# Patient Record
Sex: MALE | Race: WHITE | NOT HISPANIC OR LATINO | Employment: OTHER | URBAN - METROPOLITAN AREA
[De-identification: names, ages, dates, MRNs, and addresses within clinical notes are randomized per-mention and may not be internally consistent; named-entity substitution may affect disease eponyms.]

---

## 2023-08-28 PROBLEM — R73.03 PREDIABETES: Status: ACTIVE | Noted: 2023-08-28

## 2023-08-28 PROBLEM — K21.9 GASTROESOPHAGEAL REFLUX DISEASE: Status: ACTIVE | Noted: 2023-08-28

## 2023-08-28 PROBLEM — E78.5 DYSLIPIDEMIA: Status: ACTIVE | Noted: 2023-08-28

## 2023-08-28 PROBLEM — I10 ESSENTIAL HYPERTENSION: Status: ACTIVE | Noted: 2023-08-28

## 2023-08-28 RX ORDER — ATORVASTATIN CALCIUM 40 MG/1
1 TABLET, FILM COATED ORAL DAILY
COMMUNITY
End: 2023-11-02 | Stop reason: SDUPTHER

## 2023-08-28 RX ORDER — LISINOPRIL 10 MG/1
1 TABLET ORAL DAILY
COMMUNITY
Start: 2022-04-08 | End: 2023-11-02 | Stop reason: SDUPTHER

## 2023-08-28 RX ORDER — METFORMIN HYDROCHLORIDE 500 MG/1
1 TABLET ORAL
COMMUNITY
Start: 2021-05-26 | End: 2023-11-02 | Stop reason: SDUPTHER

## 2023-08-28 RX ORDER — HYDROGEN PEROXIDE 3 %
1 SOLUTION, NON-ORAL MISCELLANEOUS DAILY
COMMUNITY
End: 2023-11-02 | Stop reason: ALTCHOICE

## 2023-09-05 LAB
ALANINE AMINOTRANSFERASE (SGPT) (U/L) IN SER/PLAS: 22 U/L (ref 10–52)
ALBUMIN (G/DL) IN SER/PLAS: 4.6 G/DL (ref 3.4–5)
ALKALINE PHOSPHATASE (U/L) IN SER/PLAS: 64 U/L (ref 33–136)
ANION GAP IN SER/PLAS: 13 MMOL/L (ref 10–20)
ASPARTATE AMINOTRANSFERASE (SGOT) (U/L) IN SER/PLAS: 17 U/L (ref 9–39)
BASOPHILS (10*3/UL) IN BLOOD BY AUTOMATED COUNT: 0.07 X10E9/L (ref 0–0.1)
BASOPHILS/100 LEUKOCYTES IN BLOOD BY AUTOMATED COUNT: 0.8 % (ref 0–2)
BILIRUBIN TOTAL (MG/DL) IN SER/PLAS: 0.4 MG/DL (ref 0–1.2)
C REACTIVE PROTEIN (MG/L) IN SER/PLAS: 0.17 MG/DL
CALCIUM (MG/DL) IN SER/PLAS: 10.1 MG/DL (ref 8.6–10.3)
CARBON DIOXIDE, TOTAL (MMOL/L) IN SER/PLAS: 30 MMOL/L (ref 21–32)
CHLORIDE (MMOL/L) IN SER/PLAS: 102 MMOL/L (ref 98–107)
CREATINE KINASE (U/L) IN SER/PLAS: 88 U/L (ref 0–325)
CREATININE (MG/DL) IN SER/PLAS: 1.25 MG/DL (ref 0.5–1.3)
CREATININE (MG/DL) IN URINE: 51.5 MG/DL (ref 20–370)
EOSINOPHILS (10*3/UL) IN BLOOD BY AUTOMATED COUNT: 0.36 X10E9/L (ref 0–0.7)
EOSINOPHILS/100 LEUKOCYTES IN BLOOD BY AUTOMATED COUNT: 4.2 % (ref 0–6)
ERYTHROCYTE DISTRIBUTION WIDTH (RATIO) BY AUTOMATED COUNT: 13.4 % (ref 11.5–14.5)
ERYTHROCYTE MEAN CORPUSCULAR HEMOGLOBIN CONCENTRATION (G/DL) BY AUTOMATED: 30.8 G/DL (ref 32–36)
ERYTHROCYTE MEAN CORPUSCULAR VOLUME (FL) BY AUTOMATED COUNT: 89 FL (ref 80–100)
ERYTHROCYTES (10*6/UL) IN BLOOD BY AUTOMATED COUNT: 4.91 X10E12/L (ref 4.5–5.9)
GFR MALE: 65 ML/MIN/1.73M2
GLUCOSE (MG/DL) IN SER/PLAS: 101 MG/DL (ref 74–99)
HEMATOCRIT (%) IN BLOOD BY AUTOMATED COUNT: 43.9 % (ref 41–52)
HEMOGLOBIN (G/DL) IN BLOOD: 13.5 G/DL (ref 13.5–17.5)
IMMATURE GRANULOCYTES/100 LEUKOCYTES IN BLOOD BY AUTOMATED COUNT: 0.4 % (ref 0–0.9)
LEUKOCYTES (10*3/UL) IN BLOOD BY AUTOMATED COUNT: 8.5 X10E9/L (ref 4.4–11.3)
LYMPHOCYTES (10*3/UL) IN BLOOD BY AUTOMATED COUNT: 2.28 X10E9/L (ref 1.2–4.8)
LYMPHOCYTES/100 LEUKOCYTES IN BLOOD BY AUTOMATED COUNT: 26.9 % (ref 13–44)
MONOCYTES (10*3/UL) IN BLOOD BY AUTOMATED COUNT: 0.75 X10E9/L (ref 0.1–1)
MONOCYTES/100 LEUKOCYTES IN BLOOD BY AUTOMATED COUNT: 8.8 % (ref 2–10)
NEUTROPHILS (10*3/UL) IN BLOOD BY AUTOMATED COUNT: 5 X10E9/L (ref 1.2–7.7)
NEUTROPHILS/100 LEUKOCYTES IN BLOOD BY AUTOMATED COUNT: 58.9 % (ref 40–80)
PLATELETS (10*3/UL) IN BLOOD AUTOMATED COUNT: 308 X10E9/L (ref 150–450)
POTASSIUM (MMOL/L) IN SER/PLAS: 4.1 MMOL/L (ref 3.5–5.3)
PROTEIN (MG/DL) IN URINE: <4 MG/DL (ref 5–25)
PROTEIN TOTAL: 6.7 G/DL (ref 6.4–8.2)
PROTEIN/CREATININE (MG/MG) IN URINE: ABNORMAL MG/MG CREAT (ref 0–0.17)
SEDIMENTATION RATE, ERYTHROCYTE: 2 MM/H (ref 0–20)
SODIUM (MMOL/L) IN SER/PLAS: 141 MMOL/L (ref 136–145)
UREA NITROGEN (MG/DL) IN SER/PLAS: 13 MG/DL (ref 6–23)

## 2023-09-06 LAB
ANA PATTERN: ABNORMAL
ANA TITER: ABNORMAL
ANTI-CENTROMERE: <0.2 AI
ANTI-CHROMATIN: <0.2 AI
ANTI-DNA (DS): 17 IU/ML
ANTI-JO-1 IGG: <0.2 AI
ANTI-NUCLEAR ANTIBODY (ANA): POSITIVE
ANTI-RIBOSOMAL P: <0.2 AI
ANTI-RNP: 0.3 AI
ANTI-SCL-70: <0.2 AI
ANTI-SM/RNP: <0.2 AI
ANTI-SM: <0.2 AI
ANTI-SSA: <0.2 AI
ANTI-SSB: <0.2 AI
COMPLEMENT C3 (MG/DL) IN SER/PLAS: 153 MG/DL (ref 87–200)
COMPLEMENT C4 (MG/DL) IN SER/PLAS: 42 MG/DL (ref 10–50)

## 2023-09-07 LAB — B. BURGDORFERI VLSE1/PEPC10 ABS, ELISA: 0.26 IV

## 2023-09-11 LAB
TESTOSTERONE FREE (CHAN): 61.4 PG/ML (ref 35–155)
TESTOSTERONE,TOTAL,LC-MS/MS: 323 NG/DL (ref 250–1100)

## 2023-11-02 ENCOUNTER — OFFICE VISIT (OUTPATIENT)
Dept: PRIMARY CARE | Facility: CLINIC | Age: 63
End: 2023-11-02
Payer: COMMERCIAL

## 2023-11-02 VITALS
DIASTOLIC BLOOD PRESSURE: 80 MMHG | SYSTOLIC BLOOD PRESSURE: 118 MMHG | BODY MASS INDEX: 25.94 KG/M2 | TEMPERATURE: 96.4 F | WEIGHT: 208.6 LBS | OXYGEN SATURATION: 98 % | HEIGHT: 75 IN | HEART RATE: 97 BPM

## 2023-11-02 DIAGNOSIS — I10 ESSENTIAL HYPERTENSION: ICD-10-CM

## 2023-11-02 DIAGNOSIS — E78.5 DYSLIPIDEMIA: ICD-10-CM

## 2023-11-02 DIAGNOSIS — K21.9 GASTROESOPHAGEAL REFLUX DISEASE WITHOUT ESOPHAGITIS: ICD-10-CM

## 2023-11-02 DIAGNOSIS — R73.03 PREDIABETES: Primary | ICD-10-CM

## 2023-11-02 PROCEDURE — 3079F DIAST BP 80-89 MM HG: CPT | Performed by: FAMILY MEDICINE

## 2023-11-02 PROCEDURE — 99214 OFFICE O/P EST MOD 30 MIN: CPT | Performed by: FAMILY MEDICINE

## 2023-11-02 PROCEDURE — 3074F SYST BP LT 130 MM HG: CPT | Performed by: FAMILY MEDICINE

## 2023-11-02 PROCEDURE — 1036F TOBACCO NON-USER: CPT | Performed by: FAMILY MEDICINE

## 2023-11-02 RX ORDER — METFORMIN HYDROCHLORIDE 500 MG/1
500 TABLET ORAL
Qty: 180 TABLET | Refills: 1 | Status: SHIPPED | OUTPATIENT
Start: 2023-11-02 | End: 2024-02-13

## 2023-11-02 RX ORDER — ATORVASTATIN CALCIUM 40 MG/1
40 TABLET, FILM COATED ORAL DAILY
Qty: 90 TABLET | Refills: 1 | Status: SHIPPED | OUTPATIENT
Start: 2023-11-02 | End: 2024-02-13

## 2023-11-02 RX ORDER — OMEPRAZOLE 20 MG/1
20 CAPSULE, DELAYED RELEASE ORAL
COMMUNITY
End: 2023-11-02 | Stop reason: SDUPTHER

## 2023-11-02 RX ORDER — LISINOPRIL 10 MG/1
10 TABLET ORAL DAILY
Qty: 90 TABLET | Refills: 1 | Status: SHIPPED | OUTPATIENT
Start: 2023-11-02 | End: 2024-03-27

## 2023-11-02 RX ORDER — OMEPRAZOLE 20 MG/1
20 CAPSULE, DELAYED RELEASE ORAL DAILY
Qty: 90 CAPSULE | Refills: 1 | Status: SHIPPED | OUTPATIENT
Start: 2023-11-02 | End: 2024-06-04 | Stop reason: ALTCHOICE

## 2023-11-02 ASSESSMENT — PATIENT HEALTH QUESTIONNAIRE - PHQ9
SUM OF ALL RESPONSES TO PHQ9 QUESTIONS 1 AND 2: 0
1. LITTLE INTEREST OR PLEASURE IN DOING THINGS: NOT AT ALL
2. FEELING DOWN, DEPRESSED OR HOPELESS: NOT AT ALL

## 2023-11-02 ASSESSMENT — PAIN SCALES - GENERAL: PAINLEVEL: 2

## 2023-11-02 NOTE — PATIENT INSTRUCTIONS
Problem List Items Addressed This Visit             ICD-10-CM    Dyslipidemia E78.5    Relevant Orders    Lipid Panel    Hemoglobin A1c    Essential hypertension I10    Relevant Orders    Lipid Panel    Hemoglobin A1c    Gastroesophageal reflux disease K21.9    Prediabetes - Primary R73.03    Relevant Orders    Lipid Panel    Hemoglobin A1c       Additional Visit Plans:  Doing well on your medicines, stable. Refills provided. Follow up in 6 months. Will let you know if we need to adjust any dosing based on labs.       Patient Care Team:  Kendall Benitez DO as PCP - General (Family Medicine)    Kendall Benitez DO   11/02/23   10:49 AM

## 2023-11-02 NOTE — PROGRESS NOTES
Outpatient Visit Note    Chief Complaint   Patient presents with    Follow-up     6month followup       HPI:  Bhaskar Sarkar is a 63 y.o. male here for his 6-month medication follow-up visit.    For hyperlipidemia he is on 40 mg of atorvastatin once daily.  Denies any concerning side effects, no myalgia, chest pain or shortness of breath.    He is on 500 mg of metformin twice daily for prediabetes.  Denies any polyuria, polydipsia or polyphagia.  No GI upset.    He is on 10 mg of lisinopril once daily for hypertension.  Does measure blood pressure readings at home, ranging 120s/70s-80s. Denies any headache, blurry vision, nosebleeds, chest pain, shortness of breath or dizziness.  No concerning side effects.  No cough.    He takes 20 mg of omeprazole once daily for reflux.  No breakthrough heartburn.  Screening colonoscopy was done 2020 with 10-year clearance.  He will still discuss with GI when they will pursue his first EGD.      Past Medical History:   Diagnosis Date    Arthritis more than 15 years ago    GERD (gastroesophageal reflux disease) more than 15 years ago    HL (hearing loss) noticeable past 2-3 years    Hypertension 5 years?    Varicella 1966        Current Medications  Current Outpatient Medications   Medication Instructions    atorvastatin (Lipitor) 40 mg tablet 1 tablet, oral, Daily    FIBER, DEXTRIN, ORAL oral, As directed     lisinopril 10 mg tablet 1 tablet, oral, Daily    metFORMIN (Glucophage) 500 mg tablet 1 tablet, oral, 2 times daily with meals    multivitamin capsule 1 capsule, Daily    omeprazole (PRILOSEC) 20 mg, oral, Do not crush or chew.        Allergies  No Known Allergies     Past Surgical History:   Procedure Laterality Date    CIRCUMCISION, PRIMARY  1960    HERNIA REPAIR  2018?    VASECTOMY  1994     Family History   Problem Relation Name Age of Onset    Breast cancer Mother Donna Sarkar 40 - 49    Lung cancer Father Gt Sarkar     Cancer Father Gt  Mello     Diabetes Father Gt Sarkar     Rheum arthritis Sister      Testicular cancer Maternal Grandfather Matthew     Hearing loss Maternal Grandfather Matthew     Crohn's disease Other Family History     Myasthenia gravis Other Family History     Rheum arthritis Cousin      Hearing loss Maternal Grandmother Carrie     Heart disease Paternal Grandmother Chelsy     Arthritis Sister Valeria     Diabetes Father's Sister Ethyl     Diabetes Father's Brother several      Social History     Tobacco Use    Smoking status: Never     Passive exposure: Never    Smokeless tobacco: Never   Substance Use Topics    Alcohol use: Yes     Alcohol/week: 7.0 standard drinks of alcohol     Types: 6 Cans of beer, 1 Shots of liquor per week    Drug use: Never     Tobacco Use: Low Risk  (11/2/2023)    Patient History     Smoking Tobacco Use: Never     Smokeless Tobacco Use: Never     Passive Exposure: Never        ROS  All pertinent positive symptoms are included in the history of present illness.  All other systems have been reviewed and are negative and noncontributory to this patient's current ailments.    VITAL SIGNS  Vitals:    11/02/23 1034   BP: 118/80   Pulse: 97   Temp: 35.8 °C (96.4 °F)   SpO2: 98%     Vitals:    11/02/23 1034   Weight: 94.6 kg (208 lb 9.6 oz)      Body mass index is 26.07 kg/m².     PHYSICAL EXAM  GENERAL APPEARANCE: well nourished, well developed, looks like stated age, in no acute distress, not ill or tired appearing, conversing well.   HEENT: no trauma, normocephalic.   NECK: supple without rigidity, no neck mass was observed.   LUNGS: good chest wall expansion. In no acute respiratory distress.   EXTREMITIES: moving all extremities equally with no edema.   SKIN: normal skin color and pigmentation, without rash.   NEUROLOGIC EXAM: CN II-XII grossly intact, normal gait.   PSYCH: mood and affect appropriate; alert and oriented to time, place, person; no difficulty with speech or language.        Assessment/Plan   Problem List Items Addressed This Visit             ICD-10-CM    Dyslipidemia E78.5    Relevant Orders    Lipid Panel    Hemoglobin A1c    Essential hypertension I10    Relevant Orders    Lipid Panel    Hemoglobin A1c    Gastroesophageal reflux disease K21.9    Prediabetes - Primary R73.03    Relevant Orders    Lipid Panel    Hemoglobin A1c       Additional Visit Plans:  Doing well on your medicines, stable. Refills provided. Follow up in 6 months. Will let you know if we need to adjust any dosing based on labs.       Patient Care Team:  Kendall Benitez DO as PCP - General (Family Medicine)    Kendall Benitez DO   11/02/23   10:49 AM

## 2023-12-16 ENCOUNTER — LAB (OUTPATIENT)
Dept: LAB | Facility: LAB | Age: 63
End: 2023-12-16
Payer: COMMERCIAL

## 2023-12-16 DIAGNOSIS — R73.03 PREDIABETES: ICD-10-CM

## 2023-12-16 DIAGNOSIS — I10 ESSENTIAL HYPERTENSION: ICD-10-CM

## 2023-12-16 DIAGNOSIS — E78.5 DYSLIPIDEMIA: ICD-10-CM

## 2023-12-16 LAB
CHOLEST SERPL-MCNC: 128 MG/DL (ref 133–200)
CHOLEST/HDLC SERPL: 2.6 {RATIO}
EST. AVERAGE GLUCOSE BLD GHB EST-MCNC: 123 MG/DL
HBA1C MFR BLD: 5.9 %
HDLC SERPL-MCNC: 49 MG/DL
LDLC SERPL CALC-MCNC: 66 MG/DL (ref 65–130)
TRIGL SERPL-MCNC: 65 MG/DL (ref 40–150)

## 2023-12-16 PROCEDURE — 80061 LIPID PANEL: CPT

## 2023-12-16 PROCEDURE — 36415 COLL VENOUS BLD VENIPUNCTURE: CPT

## 2023-12-16 PROCEDURE — 83036 HEMOGLOBIN GLYCOSYLATED A1C: CPT

## 2024-02-12 DIAGNOSIS — R73.03 PREDIABETES: ICD-10-CM

## 2024-02-12 DIAGNOSIS — E78.5 DYSLIPIDEMIA: ICD-10-CM

## 2024-02-13 RX ORDER — ATORVASTATIN CALCIUM 40 MG/1
40 TABLET, FILM COATED ORAL DAILY
Qty: 90 TABLET | Refills: 1 | Status: SHIPPED | OUTPATIENT
Start: 2024-02-13 | End: 2024-06-04 | Stop reason: SDUPTHER

## 2024-02-13 RX ORDER — METFORMIN HYDROCHLORIDE 500 MG/1
500 TABLET ORAL
Qty: 180 TABLET | Refills: 1 | Status: SHIPPED | OUTPATIENT
Start: 2024-02-13 | End: 2024-06-04 | Stop reason: SDUPTHER

## 2024-02-22 ENCOUNTER — HOSPITAL ENCOUNTER (EMERGENCY)
Facility: HOSPITAL | Age: 64
Discharge: HOME | End: 2024-02-23
Payer: COMMERCIAL

## 2024-02-22 VITALS
SYSTOLIC BLOOD PRESSURE: 159 MMHG | OXYGEN SATURATION: 100 % | TEMPERATURE: 98.2 F | RESPIRATION RATE: 16 BRPM | HEIGHT: 75 IN | DIASTOLIC BLOOD PRESSURE: 90 MMHG | HEART RATE: 95 BPM | WEIGHT: 207 LBS | BODY MASS INDEX: 25.74 KG/M2

## 2024-02-22 DIAGNOSIS — Z48.89 ENCOUNTER FOR POST SURGICAL WOUND CHECK: Primary | ICD-10-CM

## 2024-02-22 PROCEDURE — 99281 EMR DPT VST MAYX REQ PHY/QHP: CPT

## 2024-02-22 ASSESSMENT — LIFESTYLE VARIABLES
EVER HAD A DRINK FIRST THING IN THE MORNING TO STEADY YOUR NERVES TO GET RID OF A HANGOVER: NO
HAVE YOU EVER FELT YOU SHOULD CUT DOWN ON YOUR DRINKING: NO
HAVE PEOPLE ANNOYED YOU BY CRITICIZING YOUR DRINKING: NO
EVER FELT BAD OR GUILTY ABOUT YOUR DRINKING: NO

## 2024-02-22 ASSESSMENT — COLUMBIA-SUICIDE SEVERITY RATING SCALE - C-SSRS
1. IN THE PAST MONTH, HAVE YOU WISHED YOU WERE DEAD OR WISHED YOU COULD GO TO SLEEP AND NOT WAKE UP?: NO
6. HAVE YOU EVER DONE ANYTHING, STARTED TO DO ANYTHING, OR PREPARED TO DO ANYTHING TO END YOUR LIFE?: NO
2. HAVE YOU ACTUALLY HAD ANY THOUGHTS OF KILLING YOURSELF?: NO

## 2024-02-22 ASSESSMENT — PAIN SCALES - GENERAL: PAINLEVEL_OUTOF10: 2

## 2024-02-22 ASSESSMENT — PAIN DESCRIPTION - LOCATION: LOCATION: PENIS

## 2024-02-22 ASSESSMENT — PAIN - FUNCTIONAL ASSESSMENT: PAIN_FUNCTIONAL_ASSESSMENT: 0-10

## 2024-02-23 ASSESSMENT — PAIN SCALES - GENERAL: PAINLEVEL_OUTOF10: 2

## 2024-02-23 NOTE — DISCHARGE INSTRUCTIONS
Be sure to follow up as directed in 1-2 days.  All of the details of your follow up instructions are detailed in the follow up section of this packet.     Follow-up with your surgeon in the next 24 to 48 hours.  If you develop any bleeding, increasing discoloration pain or any other symptoms return immediately to the ER    If you are being discharged with any pains medications or muscle relaxers (norco, Vicodin, hydrocodone products, Percocet, oxycodone products, flexeril, cyclobenzaprine, robaxin, norflex, brand or generic, or any other pain controlling medications with the exception of Ibuprofen and regular Tylenol, do not drive or operate machinery, climb ladders or participate in any activity that could potentially put yourself or others at risk should you get dizzy, or be/feel impaired at all.        It is important to remember that your care does not end here and you must continue to monitor your condition closely. Please return to the emergency department for any worsening or concerning signs or symptoms as directed by our conversations and the discharge instructions. Otherwise please follow up with your doctor in 2 days if no better or worse. If you do not have a doctor please contact the referral number on your discharge instructions. Please contact any physician specialists provided in your discharge notes as it is very important to follow up with them regarding your condition. If you are unable to reach the physicians provided, please come back to the Emergency Department at any time.        Return to emergency room without delay for ANY new or worsening pains or for any other symptoms or concerns.

## 2024-02-23 NOTE — ED PROVIDER NOTES
HPI   Chief Complaint   Patient presents with    Post-op Problem     Patient coming in from home concerned about the edge of his penis turning black.  Patient had a tunica albugineal plication (TAP) this morning at [facility].       HPI  Patient is a 63-year-old male here for some discoloration of the penis.  He recently underwent surgical intervention for pyrone's.  He patient says that he believes he is doing okay but started having some discoloration of the tip of the penis and was concerned therefore started to loosen the dressing some and there was some bruising noted and he is wanted to make sure that this was appropriate.                  [place] Coma Scale Score: 15                     Patient History   Past Medical History:   Diagnosis Date    Arthritis more than 15 years ago    GERD (gastroesophageal reflux disease) more than 15 years ago    HL (hearing loss) noticeable past 2-3 years    Hypertension 5 years?    Varicella 1966     Past Surgical History:   Procedure Laterality Date    CIRCUMCISION, PRIMARY  1960    HERNIA REPAIR  2018?    VASECTOMY  1994     Family History   Problem Relation Name Age of Onset    Breast cancer Mother Donna Sarkar 40 - 49    Lung cancer Father Gt Sarkar     Cancer Father Gt Sarkar     Diabetes Father Gt Sarkar     Rheum arthritis Sister      Testicular cancer Maternal Grandfather Matthew     Hearing loss Maternal Grandfather Matthew     Crohn's disease Other Family History     Myasthenia gravis Other Family History     Rheum arthritis Cousin      Hearing loss Maternal Grandmother Carrie     Heart disease Paternal Grandmother Chelsy     Arthritis Sister Valeria     Diabetes Father's Sister Ethyl     Diabetes Father's Brother several      Social History     Tobacco Use    Smoking status: Never     Passive exposure: Never    Smokeless tobacco: Never   Vaping Use    Vaping Use: Not on file   Substance Use Topics    Alcohol use: Yes      Alcohol/week: 7.0 standard drinks of alcohol     Types: 6 Cans of beer, 1 Shots of liquor per week    Drug use: Never       Physical Exam   ED Triage Vitals [02/22/24 2338]   Temperature Heart Rate Respirations BP   36.8 °C (98.2 °F) 95 16 159/90      Pulse Ox Temp Source Heart Rate Source Patient Position   100 % Temporal Monitor Sitting      BP Location FiO2 (%)     Left arm --       Physical Exam  GENERAL APPEARANCE: This patient is in no acute respiratory distress. Awake and alert.talking appropriately. Answering questions appropriately. No evidence of pressured speech     VITAL SIGNS: As per the nurses' triage record.     HEENT: Normocephalic, atraumatic.     NECK:  full gross ROM during exam    MUSCULOSKELETAL:  Full gross active range of motion. Ambulating on own with no acute difficulties    Genitourinary: Penis wrapped with Coban with area of bruising along the distal shaft right along the base of the glans, there is a little nonstick dressing present, no active hemorrhage.  Overall appears to be having good circulation with no evidence of necrosis     NEUROLOGICAL: Awake, alert and oriented x 3.    IMMUNOLOGICAL: No palpable lymphadenopathy or lymphatic streaking noted on visible skin.    DERM: No petechiae, rashes, or ecchymoses. on visible skin    PSYCH: mood and affect appear normal.    ED Course & MDM   Diagnoses as of 02/23/24 0121   Encounter for post surgical wound check       Medical Decision Making  Parts of this chart have been completed using voice recognition software. Please excuse any errors of transcription.  My thought process and reason for plan has been formulated from the time that I saw the patient until the time of disposition and is not specific to one specific moment during their visit and furthermore my MDM encompasses this entire chart and not only this text box.      HPI: Detailed above.    Exam: A medically appropriate exam performed, outlined above, given the known history and  presentation.    History Limited by: Nothing    History obtained from: Patient    External/internal records reviewed: Reviewed the surgical note from February 22, 2024    Social Determinants of Health considered during this visit: At home    Chronic conditions impacting care: None    Medications given during visit:  Medications - No data to display     Diagnostic/tests  Labs Reviewed - No data to display   No orders to display          Considerations/further MDM:  Differential includes strangulation, neurovascular compromise, considered obstructive process, no evidence of urinary retention, patient is passing urine does not appear to be in acute distress feels comfortable home-going plan.  Will follow-up with surgeon tomorrow, recommend very clear return precautions with any new different or worsening pains or symptoms.  Will rewrap with nonstick dressing.  Similar to the way that he arrived.    Normal vital signs, no evidence of significant pain and no evidence of vital sign abnormalities    I did attempt to add photographs for documentation purposes however remote haiku not functional at this time due to system down    Procedure  Procedures     Zaire Padgett PA-C  02/23/24 0120       Zaire Padgett PA-C  02/23/24 0122

## 2024-02-29 ENCOUNTER — TELEMEDICINE (OUTPATIENT)
Dept: PRIMARY CARE | Facility: CLINIC | Age: 64
End: 2024-02-29
Payer: COMMERCIAL

## 2024-02-29 DIAGNOSIS — K21.9 GASTROESOPHAGEAL REFLUX DISEASE WITHOUT ESOPHAGITIS: Primary | ICD-10-CM

## 2024-02-29 DIAGNOSIS — T88.7XXA MEDICATION SIDE EFFECT: ICD-10-CM

## 2024-02-29 PROCEDURE — 1036F TOBACCO NON-USER: CPT | Performed by: FAMILY MEDICINE

## 2024-02-29 PROCEDURE — 99214 OFFICE O/P EST MOD 30 MIN: CPT | Performed by: FAMILY MEDICINE

## 2024-02-29 RX ORDER — PANTOPRAZOLE SODIUM 40 MG/1
40 TABLET, DELAYED RELEASE ORAL DAILY
Qty: 90 TABLET | Refills: 1 | Status: SHIPPED | OUTPATIENT
Start: 2024-02-29 | End: 2024-06-04 | Stop reason: SINTOL

## 2024-02-29 RX ORDER — FAMOTIDINE 20 MG/1
20 TABLET, FILM COATED ORAL 2 TIMES DAILY
Qty: 60 TABLET | Refills: 0 | Status: SHIPPED | OUTPATIENT
Start: 2024-02-29 | End: 2024-03-27

## 2024-02-29 RX ORDER — TADALAFIL 5 MG/1
5 TABLET ORAL DAILY
COMMUNITY

## 2024-02-29 ASSESSMENT — LIFESTYLE VARIABLES
HOW OFTEN DO YOU HAVE A DRINK CONTAINING ALCOHOL: MONTHLY OR LESS
HOW OFTEN DO YOU HAVE SIX OR MORE DRINKS ON ONE OCCASION: NEVER
SKIP TO QUESTIONS 9-10: 1
HOW MANY STANDARD DRINKS CONTAINING ALCOHOL DO YOU HAVE ON A TYPICAL DAY: 1 OR 2
AUDIT-C TOTAL SCORE: 1

## 2024-02-29 ASSESSMENT — PATIENT HEALTH QUESTIONNAIRE - PHQ9
1. LITTLE INTEREST OR PLEASURE IN DOING THINGS: NOT AT ALL
2. FEELING DOWN, DEPRESSED OR HOPELESS: NOT AT ALL
SUM OF ALL RESPONSES TO PHQ9 QUESTIONS 1 AND 2: 0

## 2024-02-29 ASSESSMENT — PAIN SCALES - GENERAL: PAINLEVEL: 0-NO PAIN

## 2024-02-29 NOTE — PROGRESS NOTES
Omeprazole isn't helping with acid reflux pt stated.  Pt is unable to obtain vitals.             Outpatient Visit Note    Chief Complaint   Patient presents with    Follow-up     Discuss sx from Cialis        With patient's permission, this is a Telemedicine visit with video and audio. The provider and patient participated in this telemedicine encounter.    HPI:  Bhaskar Sarkar is a 63 y.o. male here to discuss acid reflux.    He is having worsening reflux on his cialis which he has been on for peyronie disease and sexual function. He stopped the medicine for his surgery in Mid feb and the reflux went away, now back now that  he is on the Cialis again. The 20mg of omeprazole is not covering him well.  Still getting breakthrough heartburn. [facility] is [facility].     He has been seeing urology for all this. Has not tried pantoprazole before. Not on famotidine.     On the cialis heart burn was multiple times a day.     PHQ9/GAD7:         Patient Active Problem List    Diagnosis Date Noted    Dyslipidemia 08/28/2023    Essential hypertension 08/28/2023    Gastroesophageal reflux disease 08/28/2023    Prediabetes 08/28/2023        Past Medical History:   Diagnosis Date    Arthritis more than 15 years ago    GERD (gastroesophageal reflux disease) more than 15 years ago    HL (hearing loss) noticeable past 2-3 years    Hypertension 5 years?    Peyronie disease     Varicella 1966        Current Medications  Current Outpatient Medications   Medication Instructions    atorvastatin (LIPITOR) 40 mg, oral, Daily    famotidine (PEPCID) 20 mg, oral, 2 times daily    FIBER, DEXTRIN, ORAL oral, As directed     lisinopril 10 mg, oral, Daily    metFORMIN (GLUCOPHAGE) 500 mg, oral, 2 times daily with meals    multivitamin capsule 1 capsule, Daily    omeprazole (PRILOSEC) 20 mg, oral, Daily, Do not crush or chew.    pantoprazole (PROTONIX) 40 mg, oral, Daily, Do not crush, chew, or split.     tadalafil (CIALIS) 5 mg, oral, Daily        Allergies  No Known Allergies     Past Surgical History:   Procedure Laterality Date    CIRCUMCISION, PRIMARY  1960    HERNIA REPAIR  2018?    PENIS SURGERY  02/22/2024    Tunica Albuginea Plication    VASECTOMY  1994     Family History   Problem Relation Name Age of Onset    Breast cancer Mother Donna Sarkar 40 - 49    Lung cancer Father Gt Sarkar     Cancer Father Gt Sarkar     Diabetes Father Gt Sarkar     Rheum arthritis Sister      Testicular cancer Maternal Grandfather Matthew     Hearing loss Maternal Grandfather Matthew     Crohn's disease Other Family History     Myasthenia gravis Other Family History     Rheum arthritis Cousin      Hearing loss Maternal Grandmother Carrie     Heart disease Paternal Grandmother Chelsy     Arthritis Sister Valeria     Diabetes Father's Sister Ethyl     Diabetes Father's Brother several      Social History     Tobacco Use    Smoking status: Never     Passive exposure: Never    Smokeless tobacco: Never   Vaping Use    Vaping Use: Never used   Substance Use Topics    Alcohol use: Yes     Alcohol/week: 5.0 standard drinks of alcohol     Types: 5 Cans of beer per week    Drug use: Never     Tobacco Use: Low Risk  (2/29/2024)    Patient History     Smoking Tobacco Use: Never     Smokeless Tobacco Use: Never     Passive Exposure: Never        ROS  All pertinent positive symptoms are included in the history of present illness.  All other systems have been reviewed and are negative and noncontributory to this patient's current ailments.    VITAL SIGNS  There were no vitals filed for this visit.  There were no vitals filed for this visit.   There is no height or weight on file to calculate BMI.   Patient is unable to proivide    PHYSICAL EXAM  GENERAL APPEARANCE:  Alert and oriented x 3, Pleasant and cooperative, No acute distress.   LUNGS:  No conversational dyspnea or cough during encounter.   PSYCH:  appropriate mood  and affect, no difficulty with speech.   Telemedicine visit, no other exam component done.    Counseling:       Medication education:           Education:  The patient is counseled regarding potential side-effects of all new medications          Understanding:  Patient expressed understanding of information conveyed today          Adherence:  No barriers to adherence identified    Assessment/Plan   Problem List Items Addressed This Visit             ICD-10-CM    Gastroesophageal reflux disease - Primary K21.9    Relevant Medications    pantoprazole (ProtoNix) 40 mg EC tablet    famotidine (Pepcid) 20 mg tablet     Other Visit Diagnoses         Codes    Medication side effect     T88.7XXA    Relevant Medications    pantoprazole (ProtoNix) 40 mg EC tablet    famotidine (Pepcid) 20 mg tablet            Additional Visit Plans:  We discussed that having the Cialis on board for 30-day postoperative recovery is very critical at its 5 mg dosing without any reduction and that we need to counter the side effects of increased reflux from that medicine.  So we will go ahead and do 40 mg of pantoprazole once daily which may be more effective as well as 20 mg of famotidine twice a day for 30 days.  I will continue the pantoprazole at its dose beyond that time.  As you will be remaining on Cialis most likely indefinitely and at that point after recovery perhaps you can play with the dosing of Cialis with 2.5 mg twice daily or 2.5 mg once daily and we will adjust heartburn coverage as needed at that time.    Follow-up post recovery to determine how we want approach to heartburn aspect of things.  Continue to have discussions with urology regarding alternative treatment options if available.      Patient Care Team:  Kendall Benitez DO as PCP - General (Family Medicine)    Kendall Benitez DO   02/29/24   10:18 AM

## 2024-03-22 DIAGNOSIS — T88.7XXA MEDICATION SIDE EFFECT: ICD-10-CM

## 2024-03-22 DIAGNOSIS — K21.9 GASTROESOPHAGEAL REFLUX DISEASE WITHOUT ESOPHAGITIS: ICD-10-CM

## 2024-03-24 DIAGNOSIS — I10 ESSENTIAL HYPERTENSION: ICD-10-CM

## 2024-03-27 DIAGNOSIS — K21.9 GASTROESOPHAGEAL REFLUX DISEASE WITHOUT ESOPHAGITIS: ICD-10-CM

## 2024-03-27 DIAGNOSIS — T88.7XXA MEDICATION SIDE EFFECT: ICD-10-CM

## 2024-03-27 RX ORDER — LISINOPRIL 10 MG/1
10 TABLET ORAL DAILY
Qty: 90 TABLET | Refills: 1 | Status: SHIPPED | OUTPATIENT
Start: 2024-03-27 | End: 2024-06-04 | Stop reason: SDUPTHER

## 2024-03-27 RX ORDER — FAMOTIDINE 20 MG/1
20 TABLET, FILM COATED ORAL 2 TIMES DAILY
Qty: 180 TABLET | Refills: 1 | Status: SHIPPED | OUTPATIENT
Start: 2024-03-27 | End: 2024-06-04 | Stop reason: SDUPTHER

## 2024-03-30 ENCOUNTER — LAB (OUTPATIENT)
Dept: LAB | Facility: LAB | Age: 64
End: 2024-03-30
Payer: COMMERCIAL

## 2024-04-02 DIAGNOSIS — I10 ESSENTIAL HYPERTENSION: ICD-10-CM

## 2024-04-02 DIAGNOSIS — R73.03 PREDIABETES: ICD-10-CM

## 2024-04-02 DIAGNOSIS — Z00.00 ROUTINE HEALTH MAINTENANCE: Primary | ICD-10-CM

## 2024-04-02 DIAGNOSIS — Z12.5 PROSTATE CANCER SCREENING: ICD-10-CM

## 2024-04-02 DIAGNOSIS — E78.5 DYSLIPIDEMIA: ICD-10-CM

## 2024-04-04 DIAGNOSIS — K21.9 GASTROESOPHAGEAL REFLUX DISEASE WITHOUT ESOPHAGITIS: ICD-10-CM

## 2024-04-04 DIAGNOSIS — T88.7XXA MEDICATION SIDE EFFECT: ICD-10-CM

## 2024-04-04 RX ORDER — CIMETIDINE 400 MG/1
TABLET, FILM COATED ORAL
Refills: 0 | OUTPATIENT
Start: 2024-04-04

## 2024-04-12 RX ORDER — FAMOTIDINE 20 MG/1
20 TABLET, FILM COATED ORAL 2 TIMES DAILY
Qty: 180 TABLET | Refills: 1 | OUTPATIENT
Start: 2024-04-12

## 2024-05-28 ENCOUNTER — APPOINTMENT (OUTPATIENT)
Dept: PRIMARY CARE | Facility: CLINIC | Age: 64
End: 2024-05-28
Payer: COMMERCIAL

## 2024-06-03 ASSESSMENT — PROMIS GLOBAL HEALTH SCALE
EMOTIONAL_PROBLEMS: RARELY
RATE_MENTAL_HEALTH: VERY GOOD
RATE_AVERAGE_PAIN: 1
RATE_AVERAGE_FATIGUE: MILD
CARRYOUT_PHYSICAL_ACTIVITIES: COMPLETELY
RATE_GENERAL_HEALTH: VERY GOOD
RATE_PHYSICAL_HEALTH: GOOD
RATE_SOCIAL_SATISFACTION: VERY GOOD
CARRYOUT_SOCIAL_ACTIVITIES: VERY GOOD
RATE_QUALITY_OF_LIFE: VERY GOOD

## 2024-06-04 ENCOUNTER — OFFICE VISIT (OUTPATIENT)
Dept: PRIMARY CARE | Facility: CLINIC | Age: 64
End: 2024-06-04
Payer: COMMERCIAL

## 2024-06-04 VITALS
BODY MASS INDEX: 26.11 KG/M2 | OXYGEN SATURATION: 100 % | HEIGHT: 75 IN | RESPIRATION RATE: 18 BRPM | SYSTOLIC BLOOD PRESSURE: 118 MMHG | HEART RATE: 79 BPM | DIASTOLIC BLOOD PRESSURE: 80 MMHG | TEMPERATURE: 97.8 F | WEIGHT: 210 LBS

## 2024-06-04 DIAGNOSIS — T88.7XXA MEDICATION SIDE EFFECT: ICD-10-CM

## 2024-06-04 DIAGNOSIS — R73.03 PREDIABETES: ICD-10-CM

## 2024-06-04 DIAGNOSIS — E78.5 DYSLIPIDEMIA: ICD-10-CM

## 2024-06-04 DIAGNOSIS — R76.8 ANA POSITIVE: ICD-10-CM

## 2024-06-04 DIAGNOSIS — K21.9 GASTROESOPHAGEAL REFLUX DISEASE WITHOUT ESOPHAGITIS: Primary | ICD-10-CM

## 2024-06-04 DIAGNOSIS — I10 ESSENTIAL HYPERTENSION: ICD-10-CM

## 2024-06-04 PROCEDURE — 3074F SYST BP LT 130 MM HG: CPT | Performed by: FAMILY MEDICINE

## 2024-06-04 PROCEDURE — 3079F DIAST BP 80-89 MM HG: CPT | Performed by: FAMILY MEDICINE

## 2024-06-04 PROCEDURE — 99214 OFFICE O/P EST MOD 30 MIN: CPT | Performed by: FAMILY MEDICINE

## 2024-06-04 RX ORDER — ATORVASTATIN CALCIUM 40 MG/1
40 TABLET, FILM COATED ORAL DAILY
Qty: 90 TABLET | Refills: 1 | Status: SHIPPED | OUTPATIENT
Start: 2024-06-04

## 2024-06-04 RX ORDER — LISINOPRIL 10 MG/1
10 TABLET ORAL DAILY
Qty: 90 TABLET | Refills: 1 | Status: SHIPPED | OUTPATIENT
Start: 2024-06-04

## 2024-06-04 RX ORDER — METFORMIN HYDROCHLORIDE 500 MG/1
500 TABLET ORAL
Qty: 180 TABLET | Refills: 1 | Status: SHIPPED | OUTPATIENT
Start: 2024-06-04

## 2024-06-04 RX ORDER — FAMOTIDINE 20 MG/1
20 TABLET, FILM COATED ORAL 2 TIMES DAILY
Qty: 180 TABLET | Refills: 1 | Status: SHIPPED | OUTPATIENT
Start: 2024-06-04

## 2024-06-04 ASSESSMENT — COLUMBIA-SUICIDE SEVERITY RATING SCALE - C-SSRS
6. HAVE YOU EVER DONE ANYTHING, STARTED TO DO ANYTHING, OR PREPARED TO DO ANYTHING TO END YOUR LIFE?: NO
1. IN THE PAST MONTH, HAVE YOU WISHED YOU WERE DEAD OR WISHED YOU COULD GO TO SLEEP AND NOT WAKE UP?: NO
2. HAVE YOU ACTUALLY HAD ANY THOUGHTS OF KILLING YOURSELF?: NO

## 2024-06-04 ASSESSMENT — PATIENT HEALTH QUESTIONNAIRE - PHQ9
2. FEELING DOWN, DEPRESSED OR HOPELESS: NOT AT ALL
SUM OF ALL RESPONSES TO PHQ9 QUESTIONS 1 & 2: 0
1. LITTLE INTEREST OR PLEASURE IN DOING THINGS: NOT AT ALL

## 2024-06-04 ASSESSMENT — ENCOUNTER SYMPTOMS
LOSS OF SENSATION IN FEET: 0
OCCASIONAL FEELINGS OF UNSTEADINESS: 0
DEPRESSION: 0

## 2024-06-04 ASSESSMENT — PAIN SCALES - GENERAL: PAINLEVEL: 0-NO PAIN

## 2024-06-04 NOTE — PROGRESS NOTES
Outpatient Visit Note    Chief Complaint   Patient presents with    Fu heartburn       HPI:  Bhaskar Sarkar is a 63 y.o. male here for 6-month medication follow-up.    He was having worsening reflux on his cialis which he has been on for peyronie disease and sexual dysfunction.  This was despite being on 20 mg of omeprazole daily.  Having breakthrough heartburn.  I placed him on famotidine twice daily and recommended Protonix instead of omeprazole.  Colonoscopy is next due 2030.  He was planning to talk with GI about when he will need his first EGD (forgot to ask).  Today he states that he is having to do Prevacid over the counter for insurance purposes. On the pantoprazole he was getting a bit of a butterfly rash. He is planning to see Dr. Ga again soon. Getting the rash when he is in the sun. Down to 20mg Pantoprazole now. Did get the rash on omeprazole.     For hyperlipidemia he is on 40 mg of atorvastatin once daily.  Denies any concerning side effects, no myalgia, chest pain or shortness of breath.     He is on 500 mg of metformin twice daily for prediabetes.  Denies any polyuria, polydipsia or polyphagia.  No GI upset.     He is on 10 mg of lisinopril once daily for hypertension.  Does measure blood pressure readings at home, ranging 120s/70s-80s still. Denies any headache, blurry vision, nosebleeds, chest pain, shortness of breath or dizziness.  No concerning side effects.  No cough    He has orders to have labs done.        PHQ9/GAD7:         Past Medical History:   Diagnosis Date    Arthritis more than 15 years ago    GERD (gastroesophageal reflux disease) more than 15 years ago    HL (hearing loss) noticeable past 2-3 years    Hypertension 5 years?    Peyronie disease     Varicella 1966        Current Medications  Current Outpatient Medications   Medication Instructions    atorvastatin (LIPITOR) 40 mg, oral, Daily    famotidine (PEPCID) 20 mg, oral, 2 times daily    FIBER, DEXTRIN, ORAL  oral, As directed     lisinopril 10 mg, oral, Daily    metFORMIN (GLUCOPHAGE) 500 mg, oral, 2 times daily (morning and late afternoon)    multivitamin capsule 1 capsule, Daily    tadalafil (CIALIS) 5 mg, oral, Daily        Allergies  No Known Allergies     Past Surgical History:   Procedure Laterality Date    CIRCUMCISION, PRIMARY  1960    HERNIA REPAIR  2018?    PENIS SURGERY  02/22/2024    Tunica Albuginea Plication    VASECTOMY  1994     Family History   Problem Relation Name Age of Onset    Breast cancer Mother Donna Sarkar 40 - 49    Lung cancer Father Gt Sarkar     Cancer Father Gt Sarkar     Diabetes Father Gt Sarkar     Rheum arthritis Sister      Testicular cancer Maternal Grandfather Matthew     Hearing loss Maternal Grandfather Matthew     Crohn's disease Other Family History     Myasthenia gravis Other Family History     Rheum arthritis Cousin      Hearing loss Maternal Grandmother Carrie     Heart disease Paternal Grandmother Chelsy     Arthritis Sister Valeria     Diabetes Father's Sister Ethyl     Diabetes Father's Brother several      Social History     Tobacco Use    Smoking status: Never     Passive exposure: Never    Smokeless tobacco: Never   Vaping Use    Vaping status: Never Used   Substance Use Topics    Alcohol use: Yes     Alcohol/week: 5.0 standard drinks of alcohol     Types: 5 Cans of beer per week    Drug use: Never     Tobacco Use: Low Risk  (6/4/2024)    Patient History     Smoking Tobacco Use: Never     Smokeless Tobacco Use: Never     Passive Exposure: Never        ROS  All pertinent positive symptoms are included in the history of present illness.  All other systems have been reviewed and are negative and noncontributory to this patient's current ailments.    VITAL SIGNS  Vitals:    06/04/24 1032   BP: 118/80   Pulse: 79   Resp: 18   Temp: 36.6 °C (97.8 °F)   SpO2: 100%     Vitals:    06/04/24 1032   Weight: 95.3 kg (210 lb)      Body mass index is 26.25 kg/m².      PHYSICAL EXAM  GENERAL APPEARANCE: well nourished, well developed, looks like stated age, in no acute distress, not ill or tired appearing, conversing well.   HEENT: no trauma, normocephalic.   NECK: supple without rigidity, no neck mass was observed.   LUNGS: good chest wall expansion. In no acute respiratory distress.   EXTREMITIES: moving all extremities equally with no edema.   SKIN: normal skin color and pigmentation, without rash.   NEUROLOGIC EXAM: CN II-XII grossly intact, normal gait.   PSYCH: mood and affect appropriate; alert and oriented to time, place, person; no difficulty with speech or language.     Counseling:       Medication education:           Education:  The patient is counseled regarding potential side-effects of all new medications          Understanding:  Patient expressed understanding of information conveyed today          Adherence:  No barriers to adherence identified     Assessment/Plan   Problem List Items Addressed This Visit             ICD-10-CM    Dyslipidemia E78.5    Relevant Medications    atorvastatin (Lipitor) 40 mg tablet    Essential hypertension I10    Relevant Medications    lisinopril 10 mg tablet    Gastroesophageal reflux disease - Primary K21.9    Relevant Medications    famotidine (Pepcid) 20 mg tablet    Prediabetes R73.03    Relevant Medications    metFORMIN (Glucophage) 500 mg tablet     Other Visit Diagnoses         Codes    Medication side effect     T88.7XXA    Relevant Medications    famotidine (Pepcid) 20 mg tablet    CHEKO positive     R76.8    Relevant Orders    CHEKO + FUENTES Panel            Additional Visit Plans:  Plan to return to famotidine as this works better, GoodRx coupons sent to your phone. Plan to take a break from pantoprazole and see if the rash goes away. Will see if platelets are ok on labs.     CHEKO + FUENTES panel ordered for you to discuss any changes in levels with Dr. Ga.     Doing well on your medicines. Will let you know if any  adjustments are needed based on test results.     Follow up in 6 months    Next Wellness Exam/Annual Physical Due  July    Patient Care Team:  Kendall Benitez DO as PCP - General (Family Medicine)    Kendall Benitez DO   06/12/24   9:53 AM

## 2024-06-04 NOTE — PATIENT INSTRUCTIONS
Problem List Items Addressed This Visit             ICD-10-CM    Dyslipidemia E78.5    Relevant Medications    atorvastatin (Lipitor) 40 mg tablet    Essential hypertension I10    Relevant Medications    lisinopril 10 mg tablet    Gastroesophageal reflux disease - Primary K21.9    Relevant Medications    famotidine (Pepcid) 20 mg tablet    Prediabetes R73.03    Relevant Medications    metFORMIN (Glucophage) 500 mg tablet     Other Visit Diagnoses         Codes    Medication side effect     T88.7XXA    Relevant Medications    famotidine (Pepcid) 20 mg tablet    CHEKO positive     R76.8    Relevant Orders    CHEKO + FUENTES Panel            Additional Visit Plans:  Plan to return to famotidine as this works better, GoodRx coupons sent to your phone. Plan to take a break from pantoprazole and see if the rash goes away. Will see if platelets are ok on labs.     CHEKO + FUENTES panel ordered for you to discuss any changes in levels with Dr. Ga.     Doing well on your medicines. Will let you ow if any adjustments are needed based on test results.     Follow up in 6 months    Next Wellness Exam/Annual Physical Due  July    Patient Care Team:  Kendall Benitez DO as PCP - General (Family Medicine)    Kendall Benitez DO   06/04/24   10:55 AM

## 2024-07-27 ENCOUNTER — LAB (OUTPATIENT)
Dept: LAB | Facility: LAB | Age: 64
End: 2024-07-27
Payer: COMMERCIAL

## 2024-07-27 DIAGNOSIS — E78.5 DYSLIPIDEMIA: ICD-10-CM

## 2024-07-27 DIAGNOSIS — Z00.00 ROUTINE HEALTH MAINTENANCE: ICD-10-CM

## 2024-07-27 DIAGNOSIS — Z12.5 PROSTATE CANCER SCREENING: ICD-10-CM

## 2024-07-27 DIAGNOSIS — I10 ESSENTIAL HYPERTENSION: ICD-10-CM

## 2024-07-27 DIAGNOSIS — R73.03 PREDIABETES: ICD-10-CM

## 2024-07-27 DIAGNOSIS — R76.8 ANA POSITIVE: ICD-10-CM

## 2024-07-27 LAB
ALBUMIN SERPL-MCNC: 4.1 G/DL (ref 3.5–5)
ALP BLD-CCNC: 67 U/L (ref 35–125)
ALT SERPL-CCNC: 19 U/L (ref 5–40)
ANION GAP SERPL CALC-SCNC: 11 MMOL/L
AST SERPL-CCNC: 16 U/L (ref 5–40)
BILIRUB SERPL-MCNC: 0.4 MG/DL (ref 0.1–1.2)
BUN SERPL-MCNC: 14 MG/DL (ref 8–25)
CALCIUM SERPL-MCNC: 9.6 MG/DL (ref 8.5–10.4)
CHLORIDE SERPL-SCNC: 104 MMOL/L (ref 97–107)
CHOLEST SERPL-MCNC: 118 MG/DL (ref 133–200)
CHOLEST/HDLC SERPL: 2.6 {RATIO}
CO2 SERPL-SCNC: 25 MMOL/L (ref 24–31)
CREAT SERPL-MCNC: 0.9 MG/DL (ref 0.4–1.6)
EGFRCR SERPLBLD CKD-EPI 2021: >90 ML/MIN/1.73M*2
ERYTHROCYTE [DISTWIDTH] IN BLOOD BY AUTOMATED COUNT: 13.2 % (ref 11.5–14.5)
EST. AVERAGE GLUCOSE BLD GHB EST-MCNC: 126 MG/DL
GLUCOSE SERPL-MCNC: 104 MG/DL (ref 65–99)
HBA1C MFR BLD: 6 %
HCT VFR BLD AUTO: 40.7 % (ref 41–52)
HDLC SERPL-MCNC: 45 MG/DL
HGB BLD-MCNC: 13 G/DL (ref 13.5–17.5)
LDLC SERPL CALC-MCNC: 54 MG/DL (ref 65–130)
MCH RBC QN AUTO: 27.8 PG (ref 26–34)
MCHC RBC AUTO-ENTMCNC: 31.9 G/DL (ref 32–36)
MCV RBC AUTO: 87 FL (ref 80–100)
NRBC BLD-RTO: 0 /100 WBCS (ref 0–0)
PLATELET # BLD AUTO: 283 X10*3/UL (ref 150–450)
POTASSIUM SERPL-SCNC: 4.8 MMOL/L (ref 3.4–5.1)
PROT SERPL-MCNC: 5.8 G/DL (ref 5.9–7.9)
PSA SERPL-MCNC: 1.8 NG/ML
RBC # BLD AUTO: 4.67 X10*6/UL (ref 4.5–5.9)
SODIUM SERPL-SCNC: 140 MMOL/L (ref 133–145)
TRIGL SERPL-MCNC: 93 MG/DL (ref 40–150)
TSH SERPL DL<=0.05 MIU/L-ACNC: 1.94 MIU/L (ref 0.27–4.2)
WBC # BLD AUTO: 6.5 X10*3/UL (ref 4.4–11.3)

## 2024-07-27 PROCEDURE — 36415 COLL VENOUS BLD VENIPUNCTURE: CPT

## 2024-07-27 PROCEDURE — 86038 ANTINUCLEAR ANTIBODIES: CPT

## 2024-07-27 PROCEDURE — 86235 NUCLEAR ANTIGEN ANTIBODY: CPT

## 2024-07-27 PROCEDURE — 80061 LIPID PANEL: CPT

## 2024-07-27 PROCEDURE — 80053 COMPREHEN METABOLIC PANEL: CPT

## 2024-07-27 PROCEDURE — 86225 DNA ANTIBODY NATIVE: CPT

## 2024-07-27 PROCEDURE — 84153 ASSAY OF PSA TOTAL: CPT

## 2024-07-27 PROCEDURE — 84443 ASSAY THYROID STIM HORMONE: CPT

## 2024-07-27 PROCEDURE — 85027 COMPLETE CBC AUTOMATED: CPT

## 2024-07-27 PROCEDURE — 83036 HEMOGLOBIN GLYCOSYLATED A1C: CPT

## 2024-07-28 LAB
CENTROMERE B AB SER-ACNC: <0.2 AI
CHROMATIN AB SERPL-ACNC: <0.2 AI
DSDNA AB SER-ACNC: 10 IU/ML
ENA JO1 AB SER QL IA: <0.2 AI
ENA RNP AB SER IA-ACNC: 0.3 AI
ENA SCL70 AB SER QL IA: <0.2 AI
ENA SM AB SER IA-ACNC: <0.2 AI
ENA SM+RNP AB SER QL IA: <0.2 AI
ENA SS-A AB SER IA-ACNC: <0.2 AI
ENA SS-B AB SER IA-ACNC: <0.2 AI
RIBOSOMAL P AB SER-ACNC: <0.2 AI

## 2024-07-30 LAB
ANA PATTERN: ABNORMAL
ANA SER QL HEP2 SUBST: POSITIVE
ANA TITR SER IF: ABNORMAL {TITER}

## 2024-09-04 ENCOUNTER — APPOINTMENT (OUTPATIENT)
Dept: RHEUMATOLOGY | Facility: CLINIC | Age: 64
End: 2024-09-04
Payer: COMMERCIAL

## 2024-09-04 ENCOUNTER — LAB (OUTPATIENT)
Dept: LAB | Facility: LAB | Age: 64
End: 2024-09-04
Payer: COMMERCIAL

## 2024-09-04 VITALS
DIASTOLIC BLOOD PRESSURE: 87 MMHG | OXYGEN SATURATION: 100 % | BODY MASS INDEX: 26.24 KG/M2 | SYSTOLIC BLOOD PRESSURE: 136 MMHG | HEART RATE: 81 BPM | WEIGHT: 211 LBS | HEIGHT: 75 IN

## 2024-09-04 DIAGNOSIS — M35.9 UNDIFFERENTIATED CONNECTIVE TISSUE DISEASE (MULTI): ICD-10-CM

## 2024-09-04 DIAGNOSIS — M35.9 UNDIFFERENTIATED CONNECTIVE TISSUE DISEASE (MULTI): Primary | ICD-10-CM

## 2024-09-04 LAB
CREAT UR-MCNC: 21.8 MG/DL (ref 20–370)
ERYTHROCYTE [SEDIMENTATION RATE] IN BLOOD BY WESTERGREN METHOD: 6 MM/H (ref 0–20)
PROT UR-ACNC: <4 MG/DL (ref 5–25)
PROT/CREAT UR: ABNORMAL MG/G{CREAT}

## 2024-09-04 PROCEDURE — 84156 ASSAY OF PROTEIN URINE: CPT

## 2024-09-04 PROCEDURE — 3008F BODY MASS INDEX DOCD: CPT | Performed by: INTERNAL MEDICINE

## 2024-09-04 PROCEDURE — 85652 RBC SED RATE AUTOMATED: CPT

## 2024-09-04 PROCEDURE — 82570 ASSAY OF URINE CREATININE: CPT

## 2024-09-04 PROCEDURE — 86160 COMPLEMENT ANTIGEN: CPT

## 2024-09-04 PROCEDURE — 99213 OFFICE O/P EST LOW 20 MIN: CPT | Performed by: INTERNAL MEDICINE

## 2024-09-04 PROCEDURE — 86140 C-REACTIVE PROTEIN: CPT

## 2024-09-04 PROCEDURE — 3075F SYST BP GE 130 - 139MM HG: CPT | Performed by: INTERNAL MEDICINE

## 2024-09-04 PROCEDURE — 36415 COLL VENOUS BLD VENIPUNCTURE: CPT

## 2024-09-04 PROCEDURE — 3079F DIAST BP 80-89 MM HG: CPT | Performed by: INTERNAL MEDICINE

## 2024-09-04 ASSESSMENT — ENCOUNTER SYMPTOMS
NUMBNESS: 1
ARTHRALGIAS: 1
SHORTNESS OF BREATH: 0
FATIGUE: 0
ABDOMINAL PAIN: 0
HEADACHES: 0

## 2024-09-04 NOTE — PROGRESS NOTES
"Subjective   Patient ID: Bhaskar Sarkar \"Nikhil" is a 63 y.o. male who presents for Follow-up (Patient states that he has been breaking out in a butterfly rash after sun exposure.  Joint pain in hands has been bothering him).  HPI  Patient with history of positive CHEKO with mildly elevated anti-double-stranded DNA with positive family history of dermatomyositis, myasthenia gravis, rheumatoid arthritis, Crohn's.    At his last visit I had done blood work and had positive CHEKO and mildly elevated anti-double-stranded DNA and wanted to evaluate him at least yearly or as needed.      He did have blood work with his PCP in July and again exhibit a positive CHEKO but decrease in anti-double-stranded DNA. His CBC did show some mild anemia    Parotid in the spring he had right parotid gland enlargement had dry eyes and uses drops.  Usually does not have dry mouth.  Beginning in March that he had some redness over his cheeks.  He still has the same pain in his thumbs.    Breathing issues GI issues, angling in his right lower extremity and has been found to have lumbar stenosis.  Review of Systems   Constitutional:  Negative for fatigue.   HENT:          Parotid gland hand been enlarged right    Eyes:         Dry eyes   Respiratory:  Negative for shortness of breath.    Cardiovascular:  Negative for chest pain.   Gastrointestinal:  Negative for abdominal pain.   Musculoskeletal:  Positive for arthralgias.   Skin:         Redness on the cheek   Neurological:  Positive for numbness. Negative for headaches.       Objective   Physical Exam  GEN: NAD A&O x3 appropriate affect  HENT:no enlarged glands or thyroid  EYES:  no conjunctival redness, eyelids normal  LYMPH: no cervical lymphadenopathy  NEURO: 5/5 strength upper and lower extremities, DTR +2 bicep and patellar tendons  SKIN: Some mild redness on the cheeks  PULSES: +radials  TENDER POINTS: 0/18   MSK:  No swelling in the DIP PIP MCP has CMC hypertrophy more on the left than " the right no swelling the wrists elbows shoulders no swelling the knees or ankles  Assessment/Plan        Patient with positive CHEKO and mildly elevated anti-double-stranded DNA.  Has had redness over streaks that may be a questionable malar rash.  Uncertain if he may have diagnosis of lupus as no other features.  He has arthritic symptoms mostly from osteoarthritis.  Will do further blood work but at this time uncertain if we should start him on any medication such as hydroxychloroquine as he is not having any other current symptoms.  Will check urine protein as he does have a mild decrease in his total protein and also his complement and inflammatory markers.  Will discuss his results once they have returned but otherwise if normal may just watch him yearly or as needed.    Asuncion Ga MD 09/04/24 10:55 AM

## 2024-09-05 LAB
C3 SERPL-MCNC: 147 MG/DL (ref 87–200)
C4 SERPL-MCNC: 41 MG/DL (ref 10–50)
CRP SERPL-MCNC: 0.1 MG/DL

## 2024-09-06 ASSESSMENT — PROMIS GLOBAL HEALTH SCALE
RATE_AVERAGE_FATIGUE: MILD
RATE_MENTAL_HEALTH: VERY GOOD
RATE_SOCIAL_SATISFACTION: VERY GOOD
EMOTIONAL_PROBLEMS: RARELY
CARRYOUT_SOCIAL_ACTIVITIES: VERY GOOD
CARRYOUT_PHYSICAL_ACTIVITIES: COMPLETELY
RATE_GENERAL_HEALTH: VERY GOOD
RATE_AVERAGE_PAIN: 2
RATE_PHYSICAL_HEALTH: GOOD
RATE_QUALITY_OF_LIFE: VERY GOOD

## 2024-09-12 ENCOUNTER — OFFICE VISIT (OUTPATIENT)
Dept: PRIMARY CARE | Facility: CLINIC | Age: 64
End: 2024-09-12
Payer: COMMERCIAL

## 2024-09-12 VITALS
BODY MASS INDEX: 26.36 KG/M2 | RESPIRATION RATE: 18 BRPM | DIASTOLIC BLOOD PRESSURE: 70 MMHG | SYSTOLIC BLOOD PRESSURE: 112 MMHG | HEIGHT: 75 IN | WEIGHT: 212 LBS | HEART RATE: 76 BPM | TEMPERATURE: 97.8 F | OXYGEN SATURATION: 99 %

## 2024-09-12 DIAGNOSIS — Z00.00 ROUTINE HEALTH MAINTENANCE: Primary | ICD-10-CM

## 2024-09-12 PROCEDURE — 3074F SYST BP LT 130 MM HG: CPT | Performed by: FAMILY MEDICINE

## 2024-09-12 PROCEDURE — 3008F BODY MASS INDEX DOCD: CPT | Performed by: FAMILY MEDICINE

## 2024-09-12 PROCEDURE — 99396 PREV VISIT EST AGE 40-64: CPT | Performed by: FAMILY MEDICINE

## 2024-09-12 PROCEDURE — 3078F DIAST BP <80 MM HG: CPT | Performed by: FAMILY MEDICINE

## 2024-09-12 ASSESSMENT — PATIENT HEALTH QUESTIONNAIRE - PHQ9
SUM OF ALL RESPONSES TO PHQ9 QUESTIONS 1 & 2: 0
2. FEELING DOWN, DEPRESSED OR HOPELESS: NOT AT ALL
1. LITTLE INTEREST OR PLEASURE IN DOING THINGS: NOT AT ALL

## 2024-09-12 ASSESSMENT — ENCOUNTER SYMPTOMS
LOSS OF SENSATION IN FEET: 0
DEPRESSION: 0
OCCASIONAL FEELINGS OF UNSTEADINESS: 0

## 2024-09-12 ASSESSMENT — PAIN SCALES - GENERAL: PAINLEVEL: 0-NO PAIN

## 2024-09-12 NOTE — PROGRESS NOTES
Outpatient Visit Note    Chief Complaint   Patient presents with    Annual Exam       HPI:  Bhaskar Sarkar is a 63 y.o. male here  for a complete physical.    Health Maintenance.  Immunizations: Shingles vaccination completed.  Tdap due 2030.    Denies smoking or illicit drug use, drinks 2 alcoholic beverages a week. Patient reports routine vision checks and dental cleanings, and regular exercise with walking.  He recently had routine blood work done.  No repeat labs are needed today.    Screening colonoscopy done in 2020 with 10 year clearance. Next due 2030.    Otherwise denies fevers, chills, cold/flu symptoms, SOB, CP, N/V, abdominal pain, dysuria, hematuria, melena, diarrhea or LE edema    PHQ9/GAD7:         Patient Active Problem List    Diagnosis Date Noted    Dyslipidemia 08/28/2023    Essential hypertension 08/28/2023    Gastroesophageal reflux disease 08/28/2023    Prediabetes 08/28/2023        Past Medical History:   Diagnosis Date    Arthritis more than 15 years ago    GERD (gastroesophageal reflux disease) more than 15 years ago    HL (hearing loss) noticeable past 2-3 years    Hypertension 5 years?    Low back pain 30 years    Peyronie disease     Varicella 1966        Current Medications  Current Outpatient Medications   Medication Instructions    atorvastatin (LIPITOR) 40 mg, oral, Daily    famotidine (PEPCID) 20 mg, oral, 2 times daily    FIBER, DEXTRIN, ORAL oral, As directed     lisinopril 10 mg, oral, Daily    metFORMIN (GLUCOPHAGE) 500 mg, oral, 2 times daily (morning and late afternoon)    multivitamin capsule 1 capsule, Daily    tadalafil (CIALIS) 5 mg, oral, Daily        Allergies  No Known Allergies     Immunizations  Immunization History   Administered Date(s) Administered    Flu vaccine (IIV4), preservative free *Check age/dose* 10/06/2018, 10/02/2019, 10/06/2021, 10/03/2023    Flu vaccine, quadrivalent, no egg protein, age 6 month or greater (FLUCELVAX) 10/08/2020, 09/28/2022     Flu vaccine, trivalent, preservative free, age 6 months and greater (Fluarix/Fluzone/Flulaval) 10/21/2015, 10/05/2016, 10/04/2017    Hepatitis B vaccine, adult *Check Product/Dose* 01/01/2009    Influenza, injectable, quadrivalent 10/06/2021    Novel influenza-H1N1-09, preservative-free 01/18/2010    Pfizer COVID-19 vaccine, Fall 2023, 12 years and older, (30mcg/0.3mL) 10/03/2023    Pfizer COVID-19 vaccine, bivalent, age 12 years and older (30 mcg/0.3 mL) 10/05/2022    Pfizer Gray Cap SARS-CoV-2 06/30/2022    Pfizer Purple Cap SARS-CoV-2 02/07/2021, 02/27/2021, 11/24/2021    Pneumococcal Conjugate, Unspecified 10/01/2009    RSV, 60 Years And Older (AREXVY) 11/09/2023    Td (adult) 07/01/2009    Tdap vaccine, age 7 year and older (BOOSTRIX, ADACEL) 06/22/2020    Zoster vaccine, recombinant, adult (SHINGRIX) 07/20/2018    Zoster, live 07/01/2019, 12/01/2019        Past Surgical History:   Procedure Laterality Date    CIRCUMCISION, PRIMARY  1960    HERNIA REPAIR  2018?    PENIS SURGERY  02/22/2024    Tunica Albuginea Plication    VASECTOMY  1994     Family History   Problem Relation Name Age of Onset    Breast cancer Mother Donna Sarkar 40 - 49    Lung cancer Father Gt Sarkar     Cancer Father Gt Sarkar     Diabetes Father Gt Sarkar     Rheum arthritis Sister      Arthritis Sister Valeria     Diabetes Father's Sister Ethyl     Diabetes Father's Brother several     Hearing loss Maternal Grandmother Carrie     Testicular cancer Maternal Grandfather Matthew     Hearing loss Maternal Grandfather Matthew     Heart disease Paternal Grandmother Chelsy     Rheum arthritis Cousin      Crohn's disease Other Family History     Myasthenia gravis Other Family History      Social History     Tobacco Use    Smoking status: Never     Passive exposure: Never    Smokeless tobacco: Never   Vaping Use    Vaping status: Never Used   Substance Use Topics    Alcohol use: Yes     Alcohol/week: 3.0 standard drinks of  alcohol     Types: 3 Cans of beer per week     Comment: week    Drug use: Never     Tobacco Use: Low Risk  (9/12/2024)    Patient History     Smoking Tobacco Use: Never     Smokeless Tobacco Use: Never     Passive Exposure: Never        ROS  All pertinent positive symptoms are included in the history of present illness.  All other systems have been reviewed and are negative and noncontributory to this patient's current ailments.    VITAL SIGNS  Vitals:    09/12/24 1124   BP: 112/70   Pulse: 76   Resp: 18   Temp: 36.6 °C (97.8 °F)   SpO2: 99%     Vitals:    09/12/24 1124   Weight: 96.2 kg (212 lb)      Body mass index is 26.5 kg/m².     PHYSICAL EXAM  GENERAL APPEARANCE: well nourished, well developed, looks like stated age, in no acute distress, not ill or tired appearing, conversing well.   HEENT: no trauma, normocephalic. PERRLA and EOMI with normal external exam. TM's intact with no injection or effusion, no signs of infection.   NECK: no nodes, supple without rigidity, no neck mass was observed, no thyromegaly or thyroid nodules.   HEART: regular rate and rhythm, S1 and S2 heard with no murmurs or skipped beats, no carotid bruits.   LUNGS: clear to auscultation bilaterally with no wheezes, crackles or rales.   ABDOMEN: no organomegaly, soft, nontender, nondistended, normal bowel sounds, no guarding/rebound/rigidity.   EXTREMITIES: moving all extremities equally with no edema or deformities.   SKIN: normal skin color and pigmentation, normal skin turgor without rash, lesions, or nodules visualized.   NEUROLOGIC EXAM: CN II-XII grossly intact, normal gait, normal balance, 5/5 muscle strength  PSYCH: mood and affect appropriate; alert and oriented to time, place, person; no difficulty with speech or language.   LYMPH NODES: no cervical lymphadenopathy.           Counseling:       Medication education:           Education:  The patient is counseled regarding potential side-effects of all new medications           Understanding:  Patient expressed understanding of information conveyed today          Adherence:  No barriers to adherence identified      Assessment/Plan   Problem List Items Addressed This Visit    None  Visit Diagnoses         Codes    Routine health maintenance    -  Primary Z00.00            Additional Visit Plans:  - Complete history and physical examination was performed      GENERAL RECOMMENDATIONS:  - I encourage you to eat a low-fat, moderate-carbohydrate, low-calorie diet to maintain a normal BMI (under 25) to reduce heart disease, and risk for diabetes  - Moderate intensity exercise for 30 minutes 5 days per week is recommended  - Helpful resources recommended by the American Academy of Family Practice can be found at www.familydoctor.org or www.choosemyplate.gov  - Can also consider enrolling in a program such as Weight Watchers or [employer]. The most effective diet is going to be one you can follow long term and turn into a lifestyle. The CDC recommends losing 0.5-2 lbs a week if overweight or obese.  - I recommend a routine vision check and dental cleanings every 6 months      BLOOD TESTING:  - Labs recently done  - Blood work may include a cholesterol and diabetes screen if risk factors exist (overweight, high blood pressure etc); screening for sexually transmitted infections; and Hepatitis C Virus screening      VACCINATION RECOMMENDATIONS:  - Flu shot annually. Deferred till next month  - Tetanus booster every 10 years.  Up-to-date, next due 2030  - Two pneumonia vaccinations starting at 65 years old (or earlier if risk factors - smoker, diabetic, heart or lung conditions) - not due yet.  - Shingles vaccine for those 50 years or older -completed      SCREENING RECOMMENDATIONS:  - Colon cancer screening (with colonoscopy or Cologuard) for men and women starting at age 45 until 74 years old (or earlier if family history of colon cancer) - due 2030    Next Wellness Exam/Annual Physical Due  In 1  year from today    Patient Care Team:  Kendall Benitez DO as PCP - General (Family Medicine)    Kendall Benitez DO   09/12/24   11:40 AM

## 2024-09-12 NOTE — PATIENT INSTRUCTIONS
Problem List Items Addressed This Visit    None  Visit Diagnoses         Codes    Routine health maintenance    -  Primary Z00.00            Additional Visit Plans:  - Complete history and physical examination was performed      GENERAL RECOMMENDATIONS:  - I encourage you to eat a low-fat, moderate-carbohydrate, low-calorie diet to maintain a normal BMI (under 25) to reduce heart disease, and risk for diabetes  - Moderate intensity exercise for 30 minutes 5 days per week is recommended  - Helpful resources recommended by the American Academy of Family Practice can be found at www.familydoctor.org or www.choosemyplate.gov  - Can also consider enrolling in a program such as Weight Watchers or [employer]. The most effective diet is going to be one you can follow long term and turn into a lifestyle. The CDC recommends losing 0.5-2 lbs a week if overweight or obese.  - I recommend a routine vision check and dental cleanings every 6 months      BLOOD TESTING:  - Labs recently done  - Blood work may include a cholesterol and diabetes screen if risk factors exist (overweight, high blood pressure etc); screening for sexually transmitted infections; and Hepatitis C Virus screening      VACCINATION RECOMMENDATIONS:  - Flu shot annually. Deferred till next month  - Tetanus booster every 10 years.  Up-to-date, next due 2030  - Two pneumonia vaccinations starting at 65 years old (or earlier if risk factors - smoker, diabetic, heart or lung conditions) - not due yet.  - Shingles vaccine for those 50 years or older -completed      SCREENING RECOMMENDATIONS:  - Colon cancer screening (with colonoscopy or Cologuard) for men and women starting at age 45 until 74 years old (or earlier if family history of colon cancer) - due 2030    Next Wellness Exam/Annual Physical Due  In 1 year from today    Patient Care Team:  Kendall Benitez DO as PCP - General (Family Medicine)    Kendall Benitez DO   09/12/24   11:40 AM

## 2024-11-25 ENCOUNTER — TELEPHONE (OUTPATIENT)
Dept: PRIMARY CARE | Facility: CLINIC | Age: 64
End: 2024-11-25
Payer: COMMERCIAL

## 2024-11-25 DIAGNOSIS — I10 ESSENTIAL HYPERTENSION: ICD-10-CM

## 2024-11-25 DIAGNOSIS — K21.9 GASTROESOPHAGEAL REFLUX DISEASE WITHOUT ESOPHAGITIS: ICD-10-CM

## 2024-11-25 DIAGNOSIS — E78.5 DYSLIPIDEMIA: ICD-10-CM

## 2024-11-25 DIAGNOSIS — T88.7XXA MEDICATION SIDE EFFECT: ICD-10-CM

## 2024-11-25 DIAGNOSIS — R73.03 PREDIABETES: ICD-10-CM

## 2024-11-25 NOTE — TELEPHONE ENCOUNTER
PT IS AWARE  IS OUT OF THE OFFICE TODAY.    Refill Request:    famotidine (Pepcid) 20 mg tablet Take 1 tablet (20 mg) by mouth 2 times a day.     metFORMIN (Glucophage) 500 mg tablet Take 1 tablet (500 mg) by mouth 2 times daily (morning and late afternoon).     atorvastatin (Lipitor) 40 mg tablet Take 1 tablet (40 mg) by mouth once daily.     lisinopril 10 mg tablet Take 1 tablet (10 mg) by mouth once daily.     # 90 day supply    [facility]    Last OV- 09-12-24-PE    Next OV- 02-10-25- Medication Follow up    [facility] # [phone]

## 2024-11-26 RX ORDER — FAMOTIDINE 20 MG/1
20 TABLET, FILM COATED ORAL 2 TIMES DAILY
Qty: 180 TABLET | Refills: 1 | Status: SHIPPED | OUTPATIENT
Start: 2024-11-26

## 2024-11-26 RX ORDER — ATORVASTATIN CALCIUM 40 MG/1
40 TABLET, FILM COATED ORAL DAILY
Qty: 90 TABLET | Refills: 1 | Status: SHIPPED | OUTPATIENT
Start: 2024-11-26

## 2024-11-26 RX ORDER — METFORMIN HYDROCHLORIDE 500 MG/1
500 TABLET ORAL
Qty: 180 TABLET | Refills: 1 | Status: SHIPPED | OUTPATIENT
Start: 2024-11-26

## 2024-11-26 RX ORDER — LISINOPRIL 10 MG/1
10 TABLET ORAL DAILY
Qty: 90 TABLET | Refills: 1 | Status: SHIPPED | OUTPATIENT
Start: 2024-11-26

## 2025-02-10 ENCOUNTER — APPOINTMENT (OUTPATIENT)
Dept: PRIMARY CARE | Facility: CLINIC | Age: 65
End: 2025-02-10
Payer: COMMERCIAL

## 2025-07-09 NOTE — TELEPHONE ENCOUNTER
MED AND PHARMACY POPULATED, LAST OV WAS A MED FOLLOW UP IN DECEMBER, AND HAS APT SCHEDULED FOR 6/04/2024.   Objective testing not warranted at this time given no overt signs of impaired airway protection

## 2025-09-09 ENCOUNTER — APPOINTMENT (OUTPATIENT)
Dept: RHEUMATOLOGY | Facility: CLINIC | Age: 65
End: 2025-09-09
Payer: COMMERCIAL